# Patient Record
Sex: FEMALE | Race: WHITE | NOT HISPANIC OR LATINO | Employment: UNEMPLOYED | URBAN - METROPOLITAN AREA
[De-identification: names, ages, dates, MRNs, and addresses within clinical notes are randomized per-mention and may not be internally consistent; named-entity substitution may affect disease eponyms.]

---

## 2024-07-15 ENCOUNTER — PHARMACY VISIT (OUTPATIENT)
Dept: PHARMACY | Facility: CLINIC | Age: 49
End: 2024-07-15
Payer: MEDICAID

## 2024-07-15 ENCOUNTER — HOSPITAL ENCOUNTER (EMERGENCY)
Facility: HOSPITAL | Age: 49
Discharge: HOME | End: 2024-07-15
Payer: MEDICAID

## 2024-07-15 VITALS
HEIGHT: 61 IN | TEMPERATURE: 97.1 F | BODY MASS INDEX: 32.1 KG/M2 | OXYGEN SATURATION: 98 % | SYSTOLIC BLOOD PRESSURE: 114 MMHG | WEIGHT: 170 LBS | HEART RATE: 100 BPM | RESPIRATION RATE: 19 BRPM | DIASTOLIC BLOOD PRESSURE: 75 MMHG

## 2024-07-15 DIAGNOSIS — R21 RASH: Primary | ICD-10-CM

## 2024-07-15 PROCEDURE — 99283 EMERGENCY DEPT VISIT LOW MDM: CPT

## 2024-07-15 PROCEDURE — RXMED WILLOW AMBULATORY MEDICATION CHARGE

## 2024-07-15 RX ORDER — CEPHALEXIN 500 MG/1
500 CAPSULE ORAL 4 TIMES DAILY
Qty: 40 CAPSULE | Refills: 0 | Status: SHIPPED | OUTPATIENT
Start: 2024-07-15 | End: 2024-07-25

## 2024-07-15 RX ORDER — HYDROXYZINE HYDROCHLORIDE 25 MG/1
25 TABLET, FILM COATED ORAL EVERY 6 HOURS
Qty: 12 TABLET | Refills: 0 | Status: SHIPPED | OUTPATIENT
Start: 2024-07-15 | End: 2024-07-18

## 2024-07-15 ASSESSMENT — COLUMBIA-SUICIDE SEVERITY RATING SCALE - C-SSRS
2. HAVE YOU ACTUALLY HAD ANY THOUGHTS OF KILLING YOURSELF?: NO
6. HAVE YOU EVER DONE ANYTHING, STARTED TO DO ANYTHING, OR PREPARED TO DO ANYTHING TO END YOUR LIFE?: NO
1. IN THE PAST MONTH, HAVE YOU WISHED YOU WERE DEAD OR WISHED YOU COULD GO TO SLEEP AND NOT WAKE UP?: NO

## 2024-07-15 ASSESSMENT — PAIN - FUNCTIONAL ASSESSMENT: PAIN_FUNCTIONAL_ASSESSMENT: 0-10

## 2024-07-15 ASSESSMENT — LIFESTYLE VARIABLES
EVER HAD A DRINK FIRST THING IN THE MORNING TO STEADY YOUR NERVES TO GET RID OF A HANGOVER: NO
HAVE PEOPLE ANNOYED YOU BY CRITICIZING YOUR DRINKING: NO
EVER FELT BAD OR GUILTY ABOUT YOUR DRINKING: NO
HAVE YOU EVER FELT YOU SHOULD CUT DOWN ON YOUR DRINKING: NO
TOTAL SCORE: 0

## 2024-07-15 ASSESSMENT — PAIN SCALES - GENERAL: PAINLEVEL_OUTOF10: 0 - NO PAIN

## 2024-07-15 NOTE — ED PROVIDER NOTES
"HPI   Chief Complaint   Patient presents with    Rash     Rash on face and arms, started 7/12/24.       This is a 49-year-old  female, that is currently homeless and living in her car, that is presenting to the emergency room with complaints of a rash.  She first noticed the rash 3 days ago.  It is on her face and her upper and lower extremities.  She reports that it is very itchy.  She did notice a small bug in her current was concerned for \"body lice \".  She states she does not have lice.  She denies any fevers or cold-like symptoms.  She reports that she has been transporting a gentleman that she met a month ago.  He does have wounds noted to his neck and his arms.  Denies any new lotions, detergents, foods, or medications.  Patient reports that she has begun treating her vehicle.      History provided by:  Patient   used: No                        [place] Coma Scale Score: 15                     Patient History   History reviewed. No pertinent past medical history.  History reviewed. No pertinent surgical history.  No family history on file.  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   HENT:      Head: Normocephalic.      Right Ear: External ear normal.      Left Ear: External ear normal.      Nose: Nose normal.      Mouth/Throat:      Pharynx: Oropharynx is clear.   Eyes:      Conjunctiva/sclera: Conjunctivae normal. "   Cardiovascular:      Rate and Rhythm: Regular rhythm.      Pulses: Normal pulses.      Heart sounds: Normal heart sounds.   Pulmonary:      Effort: Pulmonary effort is normal.      Breath sounds: Normal breath sounds.   Musculoskeletal:         General: Normal range of motion.      Cervical back: Normal range of motion.   Skin:     Capillary Refill: Capillary refill takes less than 2 seconds.      Comments: The patient has multiple scabbed lesions noted to her face, upper extremities, and lower extremities.  No induration appreciated.  No drainage.  They are pruritic in nature.   Neurological:      General: No focal deficit present.      Mental Status: She is alert.   Psychiatric:         Mood and Affect: Mood normal.         ED Course & MDM   Diagnoses as of 07/15/24 1304   Rash       Medical Decision Making  The patient was seen and evaluated by the nurse practitioner, Makayla Perez.  The patient is presenting to the emergency room with complaints of rash.  The rash is concerning for possible bedbugs.  The patient had a few lesions were mildly erythematous concerning for secondary infection.  The patient was provided a prescription for Keflex and Atarax.  She was educated on bedbug eradication.  She is to follow-up with [facility] in 2 to 3 days.  She is to return if worse in any way.  The patient was discharged in stable condition with computer discharge instructions given.        Procedure  Procedures     GHAZALA Rivas-ZIGGY  07/15/24 4375